# Patient Record
Sex: MALE | Race: OTHER
[De-identification: names, ages, dates, MRNs, and addresses within clinical notes are randomized per-mention and may not be internally consistent; named-entity substitution may affect disease eponyms.]

---

## 2018-08-14 ENCOUNTER — HOSPITAL ENCOUNTER (OUTPATIENT)
Dept: HOSPITAL 93 - SONOGRAMA | Age: 63
Discharge: HOME | End: 2018-08-14
Attending: PATHOLOGY
Payer: COMMERCIAL

## 2018-08-14 DIAGNOSIS — E04.1: Primary | ICD-10-CM

## 2018-09-07 ENCOUNTER — HOSPITAL ENCOUNTER (OUTPATIENT)
Dept: HOSPITAL 93 - AMB-ENDOS | Age: 63
Discharge: HOME | End: 2018-09-07
Attending: SURGERY
Payer: COMMERCIAL

## 2018-09-07 DIAGNOSIS — K51.811: Primary | ICD-10-CM

## 2018-09-07 DIAGNOSIS — K64.1: ICD-10-CM

## 2018-10-26 ENCOUNTER — HOSPITAL ENCOUNTER (EMERGENCY)
Dept: HOSPITAL 93 - ER | Age: 63
Discharge: HOME | End: 2018-10-26
Payer: COMMERCIAL

## 2018-10-26 VITALS — HEIGHT: 67 IN | BODY MASS INDEX: 21.97 KG/M2 | WEIGHT: 140 LBS

## 2018-10-26 DIAGNOSIS — I95.89: Primary | ICD-10-CM

## 2018-12-13 ENCOUNTER — HOSPITAL ENCOUNTER (INPATIENT)
Dept: HOSPITAL 93 - ER | Age: 63
LOS: 8 days | Discharge: HOME | DRG: 387 | End: 2018-12-21
Attending: INTERNAL MEDICINE | Admitting: INTERNAL MEDICINE
Payer: COMMERCIAL

## 2018-12-13 VITALS — BODY MASS INDEX: 19.93 KG/M2 | WEIGHT: 127 LBS | HEIGHT: 67 IN

## 2018-12-13 DIAGNOSIS — I10: ICD-10-CM

## 2018-12-13 DIAGNOSIS — D50.0: ICD-10-CM

## 2018-12-13 DIAGNOSIS — K64.3: ICD-10-CM

## 2018-12-13 DIAGNOSIS — E11.69: ICD-10-CM

## 2018-12-13 DIAGNOSIS — K51.811: Primary | ICD-10-CM

## 2018-12-13 PROCEDURE — 30233N1 TRANSFUSION OF NONAUTOLOGOUS RED BLOOD CELLS INTO PERIPHERAL VEIN, PERCUTANEOUS APPROACH: ICD-10-PCS | Performed by: INTERNAL MEDICINE

## 2018-12-13 PROCEDURE — 74182 MRI ABDOMEN W/CONTRAST: CPT

## 2018-12-13 PROCEDURE — BW21ZZZ COMPUTERIZED TOMOGRAPHY (CT SCAN) OF ABDOMEN AND PELVIS: ICD-10-PCS | Performed by: INTERNAL MEDICINE

## 2018-12-14 PROCEDURE — BW30YZZ MAGNETIC RESONANCE IMAGING (MRI) OF ABDOMEN USING OTHER CONTRAST: ICD-10-PCS | Performed by: INTERNAL MEDICINE

## 2018-12-14 PROCEDURE — BW30ZZZ MAGNETIC RESONANCE IMAGING (MRI) OF ABDOMEN: ICD-10-PCS | Performed by: INTERNAL MEDICINE

## 2019-02-22 ENCOUNTER — HOSPITAL ENCOUNTER (OUTPATIENT)
Dept: HOSPITAL 93 - AMB-ENDOS | Age: 64
Discharge: HOME | End: 2019-02-22
Attending: SURGERY
Payer: COMMERCIAL

## 2019-02-22 DIAGNOSIS — K52.89: Primary | ICD-10-CM

## 2019-06-17 ENCOUNTER — HOSPITAL ENCOUNTER (INPATIENT)
Dept: HOSPITAL 93 - ER | Age: 64
LOS: 11 days | Discharge: HOME | DRG: 386 | End: 2019-06-28
Attending: INTERNAL MEDICINE | Admitting: INTERNAL MEDICINE
Payer: COMMERCIAL

## 2019-06-17 VITALS — WEIGHT: 140 LBS | BODY MASS INDEX: 21.22 KG/M2 | HEIGHT: 68 IN

## 2019-06-17 DIAGNOSIS — N40.0: ICD-10-CM

## 2019-06-17 DIAGNOSIS — I10: ICD-10-CM

## 2019-06-17 DIAGNOSIS — Z86.73: ICD-10-CM

## 2019-06-17 DIAGNOSIS — I25.10: ICD-10-CM

## 2019-06-17 DIAGNOSIS — K57.30: ICD-10-CM

## 2019-06-17 DIAGNOSIS — D69.59: ICD-10-CM

## 2019-06-17 DIAGNOSIS — I95.89: ICD-10-CM

## 2019-06-17 DIAGNOSIS — K64.2: ICD-10-CM

## 2019-06-17 DIAGNOSIS — E11.65: ICD-10-CM

## 2019-06-17 DIAGNOSIS — Z79.52: ICD-10-CM

## 2019-06-17 DIAGNOSIS — D64.89: ICD-10-CM

## 2019-06-17 DIAGNOSIS — K51.811: Primary | ICD-10-CM

## 2019-06-17 DIAGNOSIS — E27.49: ICD-10-CM

## 2019-06-17 PROCEDURE — BW21ZZZ COMPUTERIZED TOMOGRAPHY (CT SCAN) OF ABDOMEN AND PELVIS: ICD-10-PCS | Performed by: INTERNAL MEDICINE

## 2019-06-18 PROCEDURE — 30233N1 TRANSFUSION OF NONAUTOLOGOUS RED BLOOD CELLS INTO PERIPHERAL VEIN, PERCUTANEOUS APPROACH: ICD-10-PCS | Performed by: INTERNAL MEDICINE

## 2019-06-20 PROCEDURE — 02HV33Z INSERTION OF INFUSION DEVICE INTO SUPERIOR VENA CAVA, PERCUTANEOUS APPROACH: ICD-10-PCS | Performed by: INTERNAL MEDICINE

## 2019-06-21 PROCEDURE — 4A12X4Z MONITORING OF CARDIAC ELECTRICAL ACTIVITY, EXTERNAL APPROACH: ICD-10-PCS | Performed by: INTERNAL MEDICINE

## 2019-07-12 ENCOUNTER — HOSPITAL ENCOUNTER (INPATIENT)
Dept: HOSPITAL 93 - ER | Age: 64
LOS: 27 days | DRG: 329 | End: 2019-08-08
Attending: INTERNAL MEDICINE | Admitting: INTERNAL MEDICINE
Payer: COMMERCIAL

## 2019-07-12 VITALS — BODY MASS INDEX: 21.97 KG/M2 | HEIGHT: 67 IN | WEIGHT: 140 LBS

## 2019-07-12 DIAGNOSIS — I08.1: ICD-10-CM

## 2019-07-12 DIAGNOSIS — E87.4: ICD-10-CM

## 2019-07-12 DIAGNOSIS — E11.65: ICD-10-CM

## 2019-07-12 DIAGNOSIS — K63.89: ICD-10-CM

## 2019-07-12 DIAGNOSIS — E86.0: ICD-10-CM

## 2019-07-12 DIAGNOSIS — D61.818: ICD-10-CM

## 2019-07-12 DIAGNOSIS — I48.1: ICD-10-CM

## 2019-07-12 DIAGNOSIS — B37.41: ICD-10-CM

## 2019-07-12 DIAGNOSIS — E11.39: ICD-10-CM

## 2019-07-12 DIAGNOSIS — J18.1: ICD-10-CM

## 2019-07-12 DIAGNOSIS — I25.118: ICD-10-CM

## 2019-07-12 DIAGNOSIS — M48.57XA: ICD-10-CM

## 2019-07-12 DIAGNOSIS — K64.2: ICD-10-CM

## 2019-07-12 DIAGNOSIS — N40.0: ICD-10-CM

## 2019-07-12 DIAGNOSIS — R65.21: ICD-10-CM

## 2019-07-12 DIAGNOSIS — K94.19: ICD-10-CM

## 2019-07-12 DIAGNOSIS — R60.1: ICD-10-CM

## 2019-07-12 DIAGNOSIS — F43.23: ICD-10-CM

## 2019-07-12 DIAGNOSIS — Z99.81: ICD-10-CM

## 2019-07-12 DIAGNOSIS — N39.8: ICD-10-CM

## 2019-07-12 DIAGNOSIS — T38.0X5A: ICD-10-CM

## 2019-07-12 DIAGNOSIS — J98.11: ICD-10-CM

## 2019-07-12 DIAGNOSIS — N20.0: ICD-10-CM

## 2019-07-12 DIAGNOSIS — R31.0: ICD-10-CM

## 2019-07-12 DIAGNOSIS — E27.3: ICD-10-CM

## 2019-07-12 DIAGNOSIS — Z79.4: ICD-10-CM

## 2019-07-12 DIAGNOSIS — D62: ICD-10-CM

## 2019-07-12 DIAGNOSIS — B37.1: ICD-10-CM

## 2019-07-12 DIAGNOSIS — E53.8: ICD-10-CM

## 2019-07-12 DIAGNOSIS — D69.59: ICD-10-CM

## 2019-07-12 DIAGNOSIS — E87.8: ICD-10-CM

## 2019-07-12 DIAGNOSIS — I95.89: ICD-10-CM

## 2019-07-12 DIAGNOSIS — J96.01: ICD-10-CM

## 2019-07-12 DIAGNOSIS — I50.31: ICD-10-CM

## 2019-07-12 DIAGNOSIS — J90: ICD-10-CM

## 2019-07-12 DIAGNOSIS — K51.811: Primary | ICD-10-CM

## 2019-07-12 DIAGNOSIS — E88.09: ICD-10-CM

## 2019-07-12 DIAGNOSIS — I11.0: ICD-10-CM

## 2019-07-12 DIAGNOSIS — Z86.73: ICD-10-CM

## 2019-07-12 PROCEDURE — 02HV33Z INSERTION OF INFUSION DEVICE INTO SUPERIOR VENA CAVA, PERCUTANEOUS APPROACH: ICD-10-PCS | Performed by: INTERNAL MEDICINE

## 2019-07-12 NOTE — NUR
SE DANII LEIGHTON ORDEN MEDICA TUBOS PILOTOS PARA 2 UNIDADES DE PRBC. SE EDUCA A
PTE Y FAMILIAR. ESPOSA FIRMA CONSENTIMIENTO DE TRANSFUSION DE FREDDY. SE HABLA
CON SOLARES DE BANCO DE FREDDY PARA NOTIFICARLE ORDEN DE TRANSFUNDIR 2 UNIDADES
DE PRBC Y TUBOS PILOTOS A LAS 6:30AM.

## 2019-07-12 NOTE — NUR
SE RECIBE PTE MASCULINO ALERTA Y ORIENTADO X3,ACOMPANADO DE FAMILIAR,LLEGA EN
AMBULANCIA REFIERE [place] RECTAL ES NOTIFICADO A  Y DARIN REFIERE 1
EPISODIO DE [place]GR[place].ES COLOCADO EN MENA #6

## 2019-07-12 NOTE — NUR
SE ORIENTA PTE SOBRE TX MEDICO EL CUAL REFIERE ENTENDER.SE EXTRAEN MUESTRAS
BAJO MEDIDAS ASEPTICAS,SE CANALIZA Y SE ADMINISTRA MEDICAMENTO LEIGHTON ORDEN
MEDICA.

## 2019-07-15 PROCEDURE — B246ZZZ ULTRASONOGRAPHY OF RIGHT AND LEFT HEART: ICD-10-PCS | Performed by: INTERNAL MEDICINE

## 2019-07-16 PROCEDURE — 30233R1 TRANSFUSION OF NONAUTOLOGOUS PLATELETS INTO PERIPHERAL VEIN, PERCUTANEOUS APPROACH: ICD-10-PCS | Performed by: INTERNAL MEDICINE

## 2019-07-18 PROCEDURE — 0T9B70Z DRAINAGE OF BLADDER WITH DRAINAGE DEVICE, VIA NATURAL OR ARTIFICIAL OPENING: ICD-10-PCS | Performed by: INTERNAL MEDICINE

## 2019-07-19 PROCEDURE — 0DTF4ZZ RESECTION OF RIGHT LARGE INTESTINE, PERCUTANEOUS ENDOSCOPIC APPROACH: ICD-10-PCS | Performed by: SURGERY

## 2019-07-19 PROCEDURE — 0D1B4Z4 BYPASS ILEUM TO CUTANEOUS, PERCUTANEOUS ENDOSCOPIC APPROACH: ICD-10-PCS | Performed by: SURGERY

## 2019-07-21 PROCEDURE — 30233N1 TRANSFUSION OF NONAUTOLOGOUS RED BLOOD CELLS INTO PERIPHERAL VEIN, PERCUTANEOUS APPROACH: ICD-10-PCS | Performed by: INTERNAL MEDICINE

## 2019-07-25 PROCEDURE — BW28YZZ COMPUTERIZED TOMOGRAPHY (CT SCAN) OF HEAD USING OTHER CONTRAST: ICD-10-PCS | Performed by: INTERNAL MEDICINE

## 2019-07-25 PROCEDURE — B54NZZZ ULTRASONOGRAPHY OF LEFT UPPER EXTREMITY VEINS: ICD-10-PCS | Performed by: INTERNAL MEDICINE

## 2019-07-26 PROCEDURE — 4A12X4Z MONITORING OF CARDIAC ELECTRICAL ACTIVITY, EXTERNAL APPROACH: ICD-10-PCS | Performed by: INTERNAL MEDICINE

## 2019-07-31 PROCEDURE — 4A033R1 MEASUREMENT OF ARTERIAL SATURATION, PERIPHERAL, PERCUTANEOUS APPROACH: ICD-10-PCS | Performed by: INTERNAL MEDICINE

## 2019-08-01 PROCEDURE — BB24ZZZ COMPUTERIZED TOMOGRAPHY (CT SCAN) OF BILATERAL LUNGS: ICD-10-PCS | Performed by: INTERNAL MEDICINE

## 2019-08-01 PROCEDURE — 3E0F7GC INTRODUCTION OF OTHER THERAPEUTIC SUBSTANCE INTO RESPIRATORY TRACT, VIA NATURAL OR ARTIFICIAL OPENING: ICD-10-PCS | Performed by: INTERNAL MEDICINE

## 2019-08-07 PROCEDURE — 0W9B30Z DRAINAGE OF LEFT PLEURAL CAVITY WITH DRAINAGE DEVICE, PERCUTANEOUS APPROACH: ICD-10-PCS | Performed by: RADIOLOGY

## 2019-08-07 PROCEDURE — 5A1935Z RESPIRATORY VENTILATION, LESS THAN 24 CONSECUTIVE HOURS: ICD-10-PCS | Performed by: INTERNAL MEDICINE

## 2019-08-07 PROCEDURE — 0BH17EZ INSERTION OF ENDOTRACHEAL AIRWAY INTO TRACHEA, VIA NATURAL OR ARTIFICIAL OPENING: ICD-10-PCS | Performed by: INTERNAL MEDICINE
